# Patient Record
Sex: FEMALE | Race: WHITE | Employment: FULL TIME
[De-identification: names, ages, dates, MRNs, and addresses within clinical notes are randomized per-mention and may not be internally consistent; named-entity substitution may affect disease eponyms.]

---

## 2017-06-17 ENCOUNTER — HEALTH MAINTENANCE LETTER (OUTPATIENT)
Age: 42
End: 2017-06-17

## 2018-11-05 ENCOUNTER — OFFICE VISIT (OUTPATIENT)
Dept: BEHAVIORAL HEALTH | Facility: CLINIC | Age: 43
End: 2018-11-05
Payer: COMMERCIAL

## 2018-11-05 ENCOUNTER — OFFICE VISIT (OUTPATIENT)
Dept: FAMILY MEDICINE | Facility: CLINIC | Age: 43
End: 2018-11-05
Payer: COMMERCIAL

## 2018-11-05 VITALS
WEIGHT: 126 LBS | RESPIRATION RATE: 16 BRPM | HEART RATE: 83 BPM | SYSTOLIC BLOOD PRESSURE: 127 MMHG | DIASTOLIC BLOOD PRESSURE: 84 MMHG | TEMPERATURE: 98.3 F | OXYGEN SATURATION: 100 % | BODY MASS INDEX: 21.63 KG/M2

## 2018-11-05 DIAGNOSIS — Z28.21 INFLUENZA VACCINATION DECLINED: ICD-10-CM

## 2018-11-05 DIAGNOSIS — G89.29 CHRONIC LOW BACK PAIN, UNSPECIFIED BACK PAIN LATERALITY, WITH SCIATICA PRESENCE UNSPECIFIED: ICD-10-CM

## 2018-11-05 DIAGNOSIS — G89.4 CHRONIC PAIN ASSOCIATED WITH SIGNIFICANT PSYCHOSOCIAL DYSFUNCTION: ICD-10-CM

## 2018-11-05 DIAGNOSIS — M79.606 PAIN OF LOWER EXTREMITY, UNSPECIFIED LATERALITY: ICD-10-CM

## 2018-11-05 DIAGNOSIS — R52 GENERALIZED BODY ACHES: ICD-10-CM

## 2018-11-05 DIAGNOSIS — R53.82 CHRONIC FATIGUE: ICD-10-CM

## 2018-11-05 DIAGNOSIS — F41.1 GENERALIZED ANXIETY DISORDER: ICD-10-CM

## 2018-11-05 DIAGNOSIS — R41.840 DIFFICULTY CONCENTRATING: ICD-10-CM

## 2018-11-05 DIAGNOSIS — M25.649 STIFFNESS OF HAND JOINT, UNSPECIFIED LATERALITY: ICD-10-CM

## 2018-11-05 DIAGNOSIS — M54.2 CERVICALGIA: ICD-10-CM

## 2018-11-05 DIAGNOSIS — Z13.6 ENCOUNTER FOR SCREENING FOR CARDIOVASCULAR DISORDERS: ICD-10-CM

## 2018-11-05 DIAGNOSIS — F41.1 GENERALIZED ANXIETY DISORDER: Primary | ICD-10-CM

## 2018-11-05 DIAGNOSIS — M54.5 CHRONIC LOW BACK PAIN, UNSPECIFIED BACK PAIN LATERALITY, WITH SCIATICA PRESENCE UNSPECIFIED: ICD-10-CM

## 2018-11-05 DIAGNOSIS — F32.9 MAJOR DEPRESSIVE DISORDER, REMISSION STATUS UNSPECIFIED, UNSPECIFIED WHETHER RECURRENT: Primary | ICD-10-CM

## 2018-11-05 DIAGNOSIS — M65.30 TRIGGER FINGER, ACQUIRED: ICD-10-CM

## 2018-11-05 LAB
BASOPHILS # BLD AUTO: 0 10E9/L (ref 0–0.2)
BASOPHILS NFR BLD AUTO: 0.2 %
DIFFERENTIAL METHOD BLD: NORMAL
EOSINOPHIL # BLD AUTO: 0.1 10E9/L (ref 0–0.7)
EOSINOPHIL NFR BLD AUTO: 1.8 %
ERYTHROCYTE [DISTWIDTH] IN BLOOD BY AUTOMATED COUNT: 11.8 % (ref 10–15)
ERYTHROCYTE [SEDIMENTATION RATE] IN BLOOD BY WESTERGREN METHOD: 10 MM/H (ref 0–20)
HCT VFR BLD AUTO: 36 % (ref 35–47)
HGB BLD-MCNC: 12 G/DL (ref 11.7–15.7)
LYMPHOCYTES # BLD AUTO: 2.8 10E9/L (ref 0.8–5.3)
LYMPHOCYTES NFR BLD AUTO: 42 %
MCH RBC QN AUTO: 30.8 PG (ref 26.5–33)
MCHC RBC AUTO-ENTMCNC: 33.3 G/DL (ref 31.5–36.5)
MCV RBC AUTO: 93 FL (ref 78–100)
MONOCYTES # BLD AUTO: 0.6 10E9/L (ref 0–1.3)
MONOCYTES NFR BLD AUTO: 8.6 %
NEUTROPHILS # BLD AUTO: 3.1 10E9/L (ref 1.6–8.3)
NEUTROPHILS NFR BLD AUTO: 47.4 %
PLATELET # BLD AUTO: 192 10E9/L (ref 150–450)
RBC # BLD AUTO: 3.89 10E12/L (ref 3.8–5.2)
WBC # BLD AUTO: 6.6 10E9/L (ref 4–11)

## 2018-11-05 PROCEDURE — 82607 VITAMIN B-12: CPT | Performed by: FAMILY MEDICINE

## 2018-11-05 PROCEDURE — 86431 RHEUMATOID FACTOR QUANT: CPT | Performed by: FAMILY MEDICINE

## 2018-11-05 PROCEDURE — 80061 LIPID PANEL: CPT | Performed by: FAMILY MEDICINE

## 2018-11-05 PROCEDURE — 84439 ASSAY OF FREE THYROXINE: CPT | Performed by: FAMILY MEDICINE

## 2018-11-05 PROCEDURE — 99204 OFFICE O/P NEW MOD 45 MIN: CPT | Performed by: FAMILY MEDICINE

## 2018-11-05 PROCEDURE — 83540 ASSAY OF IRON: CPT | Performed by: FAMILY MEDICINE

## 2018-11-05 PROCEDURE — 86140 C-REACTIVE PROTEIN: CPT | Performed by: FAMILY MEDICINE

## 2018-11-05 PROCEDURE — 85652 RBC SED RATE AUTOMATED: CPT | Performed by: FAMILY MEDICINE

## 2018-11-05 PROCEDURE — 86038 ANTINUCLEAR ANTIBODIES: CPT | Performed by: FAMILY MEDICINE

## 2018-11-05 PROCEDURE — 86200 CCP ANTIBODY: CPT | Performed by: FAMILY MEDICINE

## 2018-11-05 PROCEDURE — 85025 COMPLETE CBC W/AUTO DIFF WBC: CPT | Performed by: FAMILY MEDICINE

## 2018-11-05 PROCEDURE — 82306 VITAMIN D 25 HYDROXY: CPT | Performed by: FAMILY MEDICINE

## 2018-11-05 PROCEDURE — 36415 COLL VENOUS BLD VENIPUNCTURE: CPT | Performed by: FAMILY MEDICINE

## 2018-11-05 PROCEDURE — 83550 IRON BINDING TEST: CPT | Performed by: FAMILY MEDICINE

## 2018-11-05 PROCEDURE — 80053 COMPREHEN METABOLIC PANEL: CPT | Performed by: FAMILY MEDICINE

## 2018-11-05 PROCEDURE — 84443 ASSAY THYROID STIM HORMONE: CPT | Performed by: FAMILY MEDICINE

## 2018-11-05 PROCEDURE — 99207 ZZC NO CHARGE BEHAVIORAL WARM HANDOFF: CPT | Performed by: SOCIAL WORKER

## 2018-11-05 PROCEDURE — 82728 ASSAY OF FERRITIN: CPT | Performed by: FAMILY MEDICINE

## 2018-11-05 ASSESSMENT — ANXIETY QUESTIONNAIRES
2. NOT BEING ABLE TO STOP OR CONTROL WORRYING: SEVERAL DAYS
3. WORRYING TOO MUCH ABOUT DIFFERENT THINGS: MORE THAN HALF THE DAYS
5. BEING SO RESTLESS THAT IT IS HARD TO SIT STILL: MORE THAN HALF THE DAYS
1. FEELING NERVOUS, ANXIOUS, OR ON EDGE: SEVERAL DAYS
6. BECOMING EASILY ANNOYED OR IRRITABLE: MORE THAN HALF THE DAYS
IF YOU CHECKED OFF ANY PROBLEMS ON THIS QUESTIONNAIRE, HOW DIFFICULT HAVE THESE PROBLEMS MADE IT FOR YOU TO DO YOUR WORK, TAKE CARE OF THINGS AT HOME, OR GET ALONG WITH OTHER PEOPLE: SOMEWHAT DIFFICULT
7. FEELING AFRAID AS IF SOMETHING AWFUL MIGHT HAPPEN: NOT AT ALL
GAD7 TOTAL SCORE: 11

## 2018-11-05 ASSESSMENT — PATIENT HEALTH QUESTIONNAIRE - PHQ9
5. POOR APPETITE OR OVEREATING: NEARLY EVERY DAY
SUM OF ALL RESPONSES TO PHQ QUESTIONS 1-9: 14

## 2018-11-05 NOTE — MR AVS SNAPSHOT
After Visit Summary   11/5/2018    Nasrin Willams    MRN: [id number]           Patient Information     Date Of Birth          1975        Visit Information        Provider Department      11/5/2018 8:04 AM Darren Kaur, [facility]        Today's Diagnoses     Generalized anxiety disorder    -  1       Follow-ups after your visit        Your next 10 appointments already scheduled     Nov 19, 2018  2:40 PM CST   PHYSICAL with Jazmine Mccarty MD   [facility]    [address] [place] [id number] [phone]              Who to contact     If you have questions or need follow up information about today's clinic visit or your schedule please contact [facility] directly at [phone].  Normal or non-critical lab and imaging results will be communicated to you by MyChart, letter or phone within 4 business days after the clinic has received the results. If you do not hear from us within 7 days, please contact the clinic through MyChart or phone. If you have a critical or abnormal lab result, we will notify you by phone as soon as possible.  Submit refill requests through [employer] or call your pharmacy and they will forward the refill request to us. Please allow 3 business days for your refill to be completed.          Additional Information About Your Visit        MyChart Information     [employer] gives you secure access to your electronic health record. If you see a primary care provider, you can also send messages to your care team and make appointments. If you have questions, please call your primary care clinic.  If you do not have a primary care provider, please call [phone] and they will assist you.        Care EveryWhere ID     This is your Care EveryWhere ID. This could be used by other organizations to access your [facility] medical records  [id number]         Blood Pressure from Last 3  Encounters:   11/05/18 127/84   10/16/15 122/81   05/12/15 120/78    Weight from Last 3 Encounters:   11/05/18 57.2 kg (126 lb)   10/16/15 60.1 kg (132 lb 8 oz)   05/12/15 63.3 kg (139 lb 8 oz)              Today, you had the following     No orders found for display         Today's Medication Changes          These changes are accurate as of 11/5/18 11:59 PM.  If you have any questions, ask your nurse or doctor.               Stop taking these medicines if you haven't already. Please contact your care team if you have questions.     HYDROcodone-acetaminophen 5-325 MG per tablet   Commonly known as:  NORCO   Stopped by:  Jazmine Mccarty MD                    Primary Care Provider Office Phone # Fax #    Zee Abebe [phone] [address]  [facility] [address]        Equal Access to Services     [facility]: Hadii brandon hidalgo hadasho Socris, waaxda luqadaha, qaybta kaalmada adepreetiyada, radha hammond . So [facility] [phone].    ATENCIÓN: Si habla español, tiene a hill disposición servicios gratuitos de asistencia lingüística. Deyanira al [phone].    We comply with applicable federal civil rights laws and [place] laws. We do not discriminate on the basis of race, color, national origin, age, disability, sex, sexual orientation, or gender identity.            Thank you!     Thank you for choosing [facility]  for your care. Our goal is always to provide you with excellent care. Hearing back from our patients is one way we can continue to improve our services. Please take a few minutes to complete the written survey that you may receive in the mail after your visit with us. Thank you!             Your Updated Medication List - Protect others around you: Learn how to safely use, store and throw away your medicines at www.disposemymeds.org.          This list is accurate as of 11/5/18 11:59 PM.  Always use your most recent med list.                    Brand Name Dispense Instructions for use Diagnosis    ibuprofen 200 MG tablet    ADVIL/MOTRIN     Take 200 mg by mouth every 4 hours as needed for mild pain

## 2018-11-05 NOTE — PROGRESS NOTES
SUBJECTIVE:   Nasrin Willams is a 43 year old female who presents to clinic today for the following health issues:    Body aches, hand swelling and stiffness, swelling of hands, trigger finger on my right ring finger. pain in my legs. 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 Pt notes that she also has symptoms throughout entire body    New to me, numerous symptoms, last seen in . Reports not doctored else where. Hx of smoking, chronic pain, cervicalgia, lumbago, myofacial pain, scapula dyskinesia in the past, hx of VICKY, MDD, prior  x3, EAB, cervical biopsy, noted hx of vertebral artery dissection in  on MRI, hospitalized, managed conservatively , ct angio in  was negative for any dissection or lesion. Notes hx of seizures with tramadol in the past, last seen by Daniele for an ingrown toenail in  & referred to podiatry but never went. PCP marked as Dr Abebe but not seen her in a while either.     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 Hands are becoming stiff and swollen and right 4th finger thinks has a trigger finger as it pops and snaps after able to bend it. Right elbow bothering her 5 months now that comes and goes, has chronic upper, mid  and lower back and a kink in neck, it feels a bit stiff. Notes has not doctored in a while. Has worn tennis elbow,  braces, taken tylenol or ibuprofen, done ice and heat & they've not helped. Tries to stretch in the morning. In the morning and night is when her hands hurt the most. Mostly her right hand. Is right handed. Can hardly bend fingers at times. Usually when gets home from work day within one hour every thing starts swelling. Also notes past couple days her legs & back of thighs ache & and legs are stiff and sore. When waking up in am feels like an 80 yr old getting out of bed. Looked up online and her symptoms match fibromyalgia.    No fever or chills, no headache or dizziness, no double vision, mild blurry vision,  Worse at night , no facial pain, earache, sore throat, runny nose, post nasal drip, no trouble hearing, smelling, tasting or swallowing, no cough , no chest pain, trouble breathing, occasional  Palpitations and has to take a breath at times, No abdominal pain, heart burn, reflux, nausea or vomiting or diarrhea, always constipated, goes couple weeks with no bowel movement, told by previous doctors ( ? GI)  that's how her body works, told to eat more fiber, has tried metamucil and nothing works so when gets to two week kevin takes a laxative. Dulcolax OTC.  No blood in stools or black stools, no weight loss, some night sweats. Feels itchy upper abdomen, No dysuria, hematuria, frequency, urgency, hesitancy, incontinence, No pelvic complaints.No rash. No breast issues    Past few months periods have gotten extremely heavy and more painful than they used to be. Periods are regular every 28 to 3 days, lasts 4 to 8 days. Not heavy first day then heavy on day 2, 3 & 4, when has to change pads or tampon every couple hrs lashell the last time she had her period. LMP 10/9/18. De any day. Denies being pregnant.     Is a Single mom of three kids ages 18 to 11, working hard with not much help from their father, Feels all over the place not focussed, notes had ADHD as a child. More irritable and has less patience, would like  evaluation for ADHD.    Hx of vertebral artery dissection. notes in 2006 right after her wedding she felt like a pinched nerve while working after her honeymoon, & then her head all of a sudden felt it was going to explode, next thing she knew she was in an ambulance, dx with vertebral artery dissection & was in the  hospital 9 days, on coumadin and Vicodin at that time. Was on coumadin total 4 months ad recheck since then last ct angio in 2013 have shown no issues. ( presented with a two-week history of some stiffness in her neck and some intermittent discomfort and numbness in the right arm and hand.  While driving on the day of admission she felt woozy and had severe headache that became generalized.  She was seen in the Emergency Room with an unremarkable CT of the head and spinal tap.  An MRI of the brain looked normal, but an MR angiogram revealed a right vertebral dissection covering 5-6 cm over the mid portion of the right vertebral artery with flow above the dissection. She had unremarkable laboratory studies, including a comprehensive metabolic panel, spinal fluid studies and culture, lipid panel, sedimentation rate, homocystine level, urinalysis, and urine culture.  She was managed with heparin and started on Coumadin therapy.  She had persistent headache and nausea, but remained neurologically intact.  She was deemed well enough to return home on Monday, 02/13/2006 on Coumadin therapy.  Her INR at the time of discharge was 2.1 and her heparin was discontinued prior to discharge.  She was advised to avoid work for about a week or ten days, and then resume lighter duties until followed up with Dr. Rust on 03/06.  She was given education about Coumadin therapy, the importance of INR follow-ups and medication adjustments to maintain her anticoagulated state in the appropriate range.)     Seen 10/2009 for back strain treated with flexeril, ibuprofen & Vicodin.   12/2009 in er for abdominal pain, possible  kidney stone treated with Vicodin  3/2010 in er for pyelonephritis & dehydration  2010 back pain while pregnant, ? UTI Rx with Vicodin, Macrobid & Zofran.  2010 seen for back train advised exercises& given Vicodin  11 seen for elbow pain, xray neg  2011 UTI given Cipro  11 seen for std testing  11 uc for dysuria & referred to urology  11 vaginal bleeding/ missed spontaneous , given norco  11 sore throat & dysuria  11/15/11 UC for contusion of 1st & 5 th toe, symptomatic measures  11 ER for acute right elbow injury, & myofascial low back pain.   2012 physical & given Macrobid for dysuria  12 seen for migraine, constipation & urinary frequency, given Imitrex & Vicodin.  12 in er for abdominal pain secondary to constipation. Advised metamucil etc.  10/2/2012 seen for tension headaches & back pain & given flexeril & Vicodin & naproxen  10/8/18 seen for mechanical low back pain & referred to PT but also given more flexeril &  Vicodin & referred to neurology  12 seen for skin tags & a mole  10/22/12 no showed then asking refills on narcotics.  12 seen for anxiety , depression and back pain and given lexapro and more Vicodin. Lexapro switched to Zoloft due to formulary changes.  Noted taking more than recommended Vicodin and norflex and running out early  12 seen by sports med,MRI back normal  12/3/12 given more norco  13 treated for influenza, depression and given more Vicodin and referred to pain med  13 seen in ER for headache & right sided facial weakness,  and underwent an MYÁ of the brain, head and neck without any acute findings. She also had a CTA of the carotid vessels. At that time she had some  chronic pain on the left, dx with bells palsy , shoulder pain, and left arm paresthesia, discharged on percoset, prednisone, and valcyclovir.   2013 seen in UC and given flexeril and norco  13 seen in uc for neck spasms given  "valium & referred to ortho.   9/16/18 telephone encounter & got more Vicodin  In er 9/19/13 for persistent right shoulder pain, dx with right cervical radiculopathy & given more Vicodin  9/23 seen in clinic and continued on medrol, percoset & ibuprofen.  Ct neck unremarkable.  9/30 called & got 20 more percoset  10/7/2013 seen by [facility]. \" seems to focus on her symptoms and has somatization tendencies with past history of low back pain and headaches.  Her current complaint is mainly on the right shoulder girdle, primarily myofascial with equivocal neuropathic pain along the right upper extremity (her dominant extremity).  Her chronic pain syndrome is compounded by multiple psychosocial stressors. Advised Lidoderm 5% apply 1 patch to the affected area every day for 12 hrs (12 hrs on and 12 hrs off). Continue ibuprofen 600 mg Po q 8 hrs as needed, for 1 week.  Drink plenty of fluids.given  Methocarbamol 750 mg PO q 8 hrs as needed.  Advised to discuss with Dr. Ely PCP about the potential addition of an anxiolytic/antidepressant. Would not recommend chronic opioid medications.  Would discontinue them after done with the current supply\"  10/9/13 seen by pain psychologist  10/9/13 given tizanidine & tramadol  10/13/13 seen in er for seizures, \" she was drinking last night and felt hung over for most of the day today.  She says she relaxed most of the day and was eating and drinking normally.  The patient reports she had a headache for most of the day today.  She indicates tonight she was sitting on the couch with her boyfriend when she lost consciousness.  The patient s boyfriend reports the patient yelled out, her face went blank, she tensed up, and she began shaking.  He states the patient was shaking for about a minute, and it took her another minute or two to wake up.  The patient's boyfriend reports it took her another ten minutes after waking up to return to normal.  He notes the patient lost " "control of her bladder during the episode.  The patient states she has never had an episode like this before, and she currently complains of headache and new neck pain.  She does not think she had neck pain prior to this episode.  The patient notes she started taking Methocarbamol and Tramadol this week for chronic back and shoulder pain.  The patient denies vomiting, rhinorrhea, sore throat, or any other complaints. CT of the head is negative.  She just had MRI/MYÁ a month and a half ago, and this was unremarkable, making aneurysm, bleed, or tumor highly unlikely.  She does have some problems with chronic pain in her shoulder, and they have been changing some of her medications, including muscle relaxants and pain medications, and certainly this could have contributed. Recommended rest, no driving, follow up with PMD, and follow up with [facility]. \"  10/14/13 seen post hospital follow & advised not to drink.  11/5/13 seen for UTI symptoms , stye & backpain & given Cipro & eye ointment.  12/3/13 seen by Daniele for shoulder pain & given Tdap, flexeril & referred to PT.   12/4/13 given more Vicodin by PCP Dr Abebe & Voltaren gel  12/19/13 more norco sent in on telephone call for more.  9/24/14 seen for strep throat & given amoxil & more norco  12/1/14 seen for abdominal pain.  12/31/15 seen for headache called a migraine & nasal congestion & given Toradol & Flonase.  1/26/15 seen for finger infection& headache & given keflex & Toradol.  5/12/15 seen for bv  1016/15 see for ingrown nail referred to podiatry & given Nizoral for tinea versicolor  10/2015 one hr late for apt & rescheduled  11/16/15 no showed     Pain med hx in epic  10/29/18vicodin 5/500 mg # 20  11/7/10 Vicodin #5  1/14/11 Vicodin norco # 15  4/9/11 Vicodin norco #30  6/8/11 Vicodin norco #10  12/22/11 Vicodin norco #20  1/13/12 tylenol3 # 12  7/18/12 Imitrex 50mg # 6 1 refill  7/18/12 Vicodin norco #20  10/2/12 Vicodin norco " #15  10/8/12 Vicodin norco #15  10/12/12 Vicodin norco#15  10/24/12  Vicodin norco #15  11/5/12 Vicodin norco #30  11/19/12 Vicodin norco #30  12/3/12 Vicodin #30  12/17/12 Vicodin#30  1/2/13 Vicodin #60 w 2 refills  8/19/13 percoset #20  9/11/13 hydrocodone-acet #15  9/16/13 hydrocodone-acet #20  9/19/13 hydrocodone-acet #15  9/23/13 percoset #20  9/30/13 percoset #20  10/14/13 tramadol #180  12/5/12 norco #20  12/17/13 norco #20  9/24/14 norco # 8  1/3/15 norco # 12    Mn  negative    PHQ-9 ([employer]) 11/5/2018   No Interest In Doing Things    Feeling Depressed    Trouble Sleeping    Tired / No Energy    No appetite or Over-Eating    Feeling Bad about Self    Trouble Concentrating    Moving Slow or Restless    Suicidal Thoughts    Total Score    1.  Little interest or pleasure in doing things 2   2.  Feeling down, depressed, or hopeless 1   3.  Trouble falling or staying asleep, or sleeping too much 1   4.  Feeling tired or having little energy 3   5.  Poor appetite or overeating 1   6.  Feeling bad about yourself 1   7.  Trouble concentrating 2   8.  Moving slowly or restless 3   9.  Suicidal or self-harm thoughts 0   PHQ-9 Total Score 14   Difficulty at work, home, or with people Somewhat difficult   VICKY-7   [employer], 2002; Used with Permission) 11/5/2018   Over the last 2 weeks, how often have you been bothered by feeling nervous, anxious or on edge?    Over the last 2 weeks, how often have you been bothered by not being able to stop or control worrying?    Over the last 2 weeks, how often have you been bothered by worrying too much about different things?    Over the last 2 weeks, how often have you been bothered by trouble relaxing?    Over the last 2 weeks, how often have you been bothered by being so restless that it is hard to sit still?    Over the last 2 weeks, how often have you been bothered by becoming easily annoyed or irritable?    Over the last 2 weeks, how often have you been bothered by  feeling afraid as if something awful might happen?    VICKY-7 Total Score =     1. Feeling nervous, anxious, or on edge 1   2. Not being able to stop or control worrying 1   3. Worrying too much about different things 2   4. Trouble relaxing 3   5. Being so restless that it is hard to sit still 2   6. Becoming easily annoyed or irritable 2   7. Feeling afraid, as if something awful might happen 0   VICKY-7 Total Score 11   If you checked any problems, how difficult have they made it for you to do your work, take care of things at home, or get along with other people? Somewhat difficult     Problem list and histories reviewed & adjusted, as indicated.  Additional history: as documented    Patient Active Problem List   Diagnosis     Lumbago     Major depression     Generalized anxiety disorder     Chronic pain associated with significant psychosocial dysfunction     Scapular dysfunction     Cervicalgia     Past Surgical History:   Procedure Laterality Date     C NONSPECIFIC PROCEDURE  11/29/99    vaginal delivery x 1     SURGICAL HISTORY OF -   2001    EAB     SURGICAL HISTORY OF -   4/21/97    cervical biopsy       Social History   Substance Use Topics     Smoking status: Current Some Day Smoker     Types: Other     Smokeless tobacco: Never Used      Comment: vape     Alcohol use Yes      Comment: rare     Family History   Problem Relation Age of Onset     Hypertension Mother      Thyroid Disease Mother      HEART DISEASE Brother      MI         Current Outpatient Prescriptions   Medication Sig Dispense Refill     ibuprofen (ADVIL,MOTRIN) 200 MG tablet Take 200 mg by mouth every 4 hours as needed for mild pain       Allergies   Allergen Reactions     Tramadol      seizure     Recent Labs   Lab Test  11/05/18   1900  12/01/14   2018  10/13/13   0108   LDL  54   --    --    HDL  49*   --    --    TRIG  198*   --    --    ALT  24  30  51*   CR  0.57  0.61  0.81   GFRESTIMATED  >90  >90  Non African American GFR Calc    80    GFRESTBLACK  >90  >90  African American GFR Calc    >90   POTASSIUM  4.4  3.9  3.7   TSH  7.08*   --    --       BP Readings from Last 3 Encounters:   11/05/18 127/84   10/16/15 122/81   05/12/15 120/78    Wt Readings from Last 3 Encounters:   11/05/18 126 lb (57.2 kg)   10/16/15 132 lb 8 oz (60.1 kg)   05/12/15 139 lb 8 oz (63.3 kg)                  Labs reviewed in EPIC    Reviewed and updated as needed this visit by clinical staff       Reviewed and updated as needed this visit by Provider         ROS:  Constitutional, HEENT, cardiovascular, pulmonary, GI, , musculoskeletal, neuro, skin, endocrine and psych systems are negative, except as otherwise noted.    OBJECTIVE:     /84 (BP Location: Left arm, Patient Position: Chair, Cuff Size: Adult Regular)  Pulse 83  Temp 98.3  F (36.8  C) (Oral)  Resp 16  Wt 126 lb (57.2 kg)  SpO2 100%  BMI 21.63 kg/m2  Body mass index is 21.63 kg/(m^2).  GENERAL: healthy, alert and no distress  EYES: Eyes grossly normal to inspection, PERRL and conjunctivae and sclerae normal  HENT: ear canals and TM's normal, nose and mouth without ulcers or lesions  NECK: no adenopathy, no asymmetry, masses, or scars and thyroid normal to palpation  RESP: lungs clear to auscultation - no rales, rhonchi or wheezes  CV: regular rate and rhythm, normal S1 S2, no S3 or S4, no murmur, click or rub, no peripheral edema and peripheral pulses strong  ABDOMEN: soft, non tender, no hepatosplenomegaly, no masses and bowel sounds normal  MS: no gross musculoskeletal defects noted, no edema,right 4th finger PIP trigger joint  SKIN: no suspicious lesions or rashes  NEURO: Normal strength and tone, mentation intact and speech normal  PSYCH: mentation appears normal, affect normal/bright, anxious, fatigued, judgement and insight intact and appearance well groomed    Diagnostic Test Results:  No results found for this or any previous visit (from the past 24 hour(s)).    ASSESSMENT/PLAN:     1. Major  depressive disorder, remission status unspecified, unspecified whether recurrent  Safe, not suicidal. Pain and hard work affecting mood. Declines meds. Labs today to evaluate symptoms. Would avoid narcotics. Cymbalta may be a choice to consider. Meet with Darren. Consider counseling.Referral for diagnostic assessment, therapy and psychiatrist made .Referral to PT and ortho for some of your symptoms and trigger finger. They will be calling her. Will hold of on a pain consult yet. referral placed but no apt made yet.suspect myofascial and fibromyalgia. Narcotics indicated in neither case. Follow up in 2 weeks for a physical, pap and follow up from today. Consider the flu vaccine.reports does vaping, cessation may help long term. Depression action plan given   - CBC with platelets differential  - Comprehensive metabolic panel  - TSH with free T4 reflex  - MENTAL HEALTH REFERRAL  - Adult; Outpatient Treatment, Psychiatry and Medication Management; Individual/Couples/Family/Group Therapy/Health Psychology; FM: [facility] [phone]; We will contact you to schedule the appointment...  - DEPRESSION ACTION PLAN (DAP)  - MENTAL HEALTH REFERRAL  - Adult; Assessments and Testing, Outpatient Treatment; ADHD; FMG: [facility] [phone]; We will contact you to schedule the appointment or please call with any questions; Individual/Couples/Family/Soila...  - T4 free  - T4 free    2. Chronic fatigue  Unclear etiology/diagnosis with uncertain prognosis requiring further workup below. Suspect multifactorial  - CBC with platelets differential  - Comprehensive metabolic panel  - TSH with free T4 reflex  - ESR: Erythrocyte sedimentation rate  - CRP, inflammation  - Vitamin B12  - Vitamin D Deficiency  - Ferritin  - Iron and iron binding capacity  - Anti Nuclear Tri IgG by IFA with Reflex  - [facility] PT, HAND, AND CHIROPRACTIC REFERRAL; Future  - ORTHO  REFERRAL  - PAIN MANAGEMENT REFERRAL  -  MENTAL HEALTH REFERRAL  - Adult; Outpatient Treatment, Psychiatry and Medication Management; Individual/Couples/Family/Group Therapy/Health Psychology; [facility] [phone]; We will contact you to schedule the appointment...  - Rheumatoid factor  - Cyclic Citrullinated Peptide Antibody IgG    3. Chronic pain associated with significant psychosocial dysfunction  Avoid narcotics. May beneft from pain for other solutions like accupuncture massage therapy for fibromyalgia.  - CBC with platelets differential  - Comprehensive metabolic panel  - TSH with free T4 reflex  - ESR: Erythrocyte sedimentation rate  - CRP, inflammation  - Vitamin B12  - Vitamin D Deficiency  - Ferritin  - Iron and iron binding capacity  - Anti Nuclear Tri IgG by IFA with Reflex  - [facility], AND CHIROPRACTIC REFERRAL; Future  - ORTHO  REFERRAL  - PAIN MANAGEMENT REFERRAL  - MENTAL HEALTH REFERRAL  - Adult; Outpatient Treatment, Psychiatry and Medication Management; Individual/Couples/Family/Group Therapy/Health Psychology; [facility] [phone]; We will contact you to schedule the appointment...  - Rheumatoid factor  - Cyclic Citrullinated Peptide Antibody IgG    4. Generalized anxiety disorder  - CBC with platelets differential  - Comprehensive metabolic panel  - TSH with free T4 reflex  - MENTAL HEALTH REFERRAL  - Adult; Outpatient Treatment, Psychiatry and Medication Management; Individual/Couples/Family/Group Therapy/Health Psychology; [facility] [place] [phone]; We will contact you to schedule the appointment...    5. Chronic low back pain, unspecified back pain laterality, with sciatica presence unspecified  - CBC with platelets differential  - Comprehensive metabolic panel  - TSH with free T4 reflex  - ESR: Erythrocyte sedimentation rate  - CRP, inflammation  - Anti Nuclear Tri IgG by IFA with Reflex  - [facility], [facility] CHIROPRACTIC REFERRAL; Future  -  ORTHO  REFERRAL  - PAIN MANAGEMENT REFERRAL  - Rheumatoid factor  - Cyclic Citrullinated Peptide Antibody IgG    6. Cervicalgia  - CBC with platelets differential  - Comprehensive metabolic panel  - ESR: Erythrocyte sedimentation rate  - CRP, inflammation  - Anti Nuclear Tri IgG by IFA with Reflex  - [facility], [facility] CHIROPRACTIC REFERRAL; Future  - ORTHO  REFERRAL  - PAIN MANAGEMENT REFERRAL  - Rheumatoid factor  - Cyclic Citrullinated Peptide Antibody IgG    7. Generalized body aches  - CBC with platelets differential  - Comprehensive metabolic panel  - TSH with free T4 reflex  - ESR: Erythrocyte sedimentation rate  - CRP, inflammation  - Ferritin    8. Stiffness of hand joint, unspecified laterality  - CBC with platelets differential  - Comprehensive metabolic panel  - ESR: Erythrocyte sedimentation rate  - CRP, inflammation  - Anti Nuclear Tri IgG by IFA with Reflex  - [facility], [facility] CHIROPRACTIC REFERRAL; Future  - ORTHO  REFERRAL  - PAIN MANAGEMENT REFERRAL  - Rheumatoid factor  - Cyclic Citrullinated Peptide Antibody IgG    9. Trigger finger, acquired right 4th PIP   See ortho  - CBC with platelets differential  - Comprehensive metabolic panel  - ESR: Erythrocyte sedimentation rate  - CRP, inflammation  - Anti Nuclear Tri IgG by IFA with Reflex  - [facility] HAND, AND CHIROPRACTIC REFERRAL; Future  - ORTHO  REFERRAL  - PAIN MANAGEMENT REFERRAL  - Rheumatoid factor  - Cyclic Citrullinated Peptide Antibody IgG    10. Pain of lower extremity, unspecified laterality  - CBC with platelets differential  - Comprehensive metabolic panel  - Ferritin  - Iron and iron binding capacity  - Anti Nuclear Tri IgG by IFA with Reflex  - [facility], AND CHIROPRACTIC REFERRAL; Future  - ORTHO  REFERRAL  - PAIN MANAGEMENT REFERRAL    11. Difficulty concentrating  Reports ADHD as a child. Will need reevaluating as an adult. And see psyche after that for meds if indicated.   - MENTAL  HEALTH REFERRAL  - Adult; Assessments and Testing; ADHD; FMG: [facility] [phone]; We will contact you to schedule the appointment or please call with any questions    12. Encounter for screening for cardiovascular disorders  - Lipid panel reflex to direct LDL Non-fasting    13. Influenza vaccination declined    See Patient Instructions    Jazmine Mccarty MD  [facility]

## 2018-11-05 NOTE — LETTER
My Depression Action Plan  Name: Nasrin Willams   Date of Birth 1975  Date: 11/5/2018    My doctor: Zee Abebe   My clinic: [address] [address] [phone]          GREEN    ZONE   Good Control    What it looks like:     Things are going generally well. You have normal up s and down s. You may even feel depressed from time to time, but bad moods usually last less than a day.   What you need to do:  1. Continue to care for yourself (see self care plan)  2. Check your depression survival kit and update it as needed  3. Follow your physician s recommendations including any medication.  4. Do not stop taking medication unless you consult with your physician first.           YELLOW         ZONE Getting Worse    What it looks like:     Depression is starting to interfere with your life.     It may be hard to get out of bed; you may be starting to isolate yourself from others.    Symptoms of depression are starting to last most all day and this has happened for several days.     You may have suicidal thoughts but they are not constant.   What you need to do:     1. Call your care team, your response to treatment will improve if you keep your care team informed of your progress. Yellow periods are signs an adjustment may need to be made.     2. Continue your self-care, even if you have to fake it!    3. Talk to someone in your support network    4. Open up your depression survival kit           RED    ZONE Medical Alert - Get Help    What it looks like:     Depression is seriously interfering with your life.     You may experience these or other symptoms: You can t get out of bed most days, can t work or engage in other necessary activities, you have trouble taking care of basic hygiene, or basic responsibilities, thoughts of suicide or death that will not go away, self-injurious behavior.     What you need to do:  1. Call your care  team and request a same-day appointment. If they are not available (weekends or after hours) call your local crisis line, emergency room or 911.            Depression Self Care Plan / Survival Kit    Self-Care for Depression  Here s the deal. Your body and mind are really not as separate as most people think.  What you do and think affects how you feel and how you feel influences what you do and think. This means if you do things that people who feel good do, it will help you feel better.  Sometimes this is all it takes.  There is also a place for medication and therapy depending on how severe your depression is, so be sure to consult with your medical provider and/ or Behavioral Health Consultant if your symptoms are worsening or not improving.     In order to better manage my stress, I will:    Exercise  Get some form of exercise, every day. This will help reduce pain and release endorphins, the  feel good  chemicals in your brain. This is almost as good as taking antidepressants!  This is not the same as joining a gym and then never going! (they count on that by the way ) It can be as simple as just going for a walk or doing some gardening, anything that will get you moving.      Hygiene   Maintain good hygiene (Get out of bed in the morning, Make your bed, Brush your teeth, Take a shower, and Get dressed like you were going to work, even if you are unemployed).  If your clothes don't fit try to get ones that do.    Diet  I will strive to eat foods that are good for me, drink plenty of water, and avoid excessive sugar, caffeine, alcohol, and other mood-altering substances.  Some foods that are helpful in depression are: complex carbohydrates, B vitamins, flaxseed, fish or fish oil, fresh fruits and vegetables.    Psychotherapy  I agree to participate in Individual Therapy (if recommended).    Medication  If prescribed medications, I agree to take them.  Missing doses can result in serious side effects.  I  understand that drinking alcohol, or other illicit drug use, may cause potential side effects.  I will not stop my medication abruptly without first discussing it with my provider.    Staying Connected With Others  I will stay in touch with my friends, family members, and my primary care provider/team.    Use your imagination  Be creative.  We all have a creative side; it doesn t matter if it s oil painting, sand castles, or mud pies! This will also kick up the endorphins.    Witness Beauty  (AKA stop and smell the roses) Take a look outside, even in mid-winter. Notice colors, textures. Watch the squirrels and birds.     Service to others  Be of service to others.  There is always someone else in need.  By helping others we can  get out of ourselves  and remember the really important things.  This also provides opportunities for practicing all the other parts of the program.    Humor  Laugh and be silly!  Adjust your TV habits for less news and crime-drama and more comedy.    Control your stress  Try breathing deep, massage therapy, biofeedback, and meditation. Find time to relax each day.     My support system    Clinic Contact:  Phone number:    Contact 1:  Phone number:    Contact 2:  Phone number:    Taoist/:  Phone number:    Therapist:  Phone number:    Local crisis center:    Phone number:    Other community support:  Phone number:

## 2018-11-06 ENCOUNTER — TELEPHONE (OUTPATIENT)
Dept: PALLIATIVE MEDICINE | Facility: CLINIC | Age: 43
End: 2018-11-06
Payer: COMMERCIAL

## 2018-11-06 LAB
ALBUMIN SERPL-MCNC: 4 G/DL (ref 3.4–5)
ALP SERPL-CCNC: 33 U/L (ref 40–150)
ALT SERPL W P-5'-P-CCNC: 24 U/L (ref 0–50)
ANION GAP SERPL CALCULATED.3IONS-SCNC: 10 MMOL/L (ref 3–14)
AST SERPL W P-5'-P-CCNC: 24 U/L (ref 0–45)
BILIRUB SERPL-MCNC: 0.3 MG/DL (ref 0.2–1.3)
BUN SERPL-MCNC: 10 MG/DL (ref 7–30)
CALCIUM SERPL-MCNC: 8.9 MG/DL (ref 8.5–10.1)
CHLORIDE SERPL-SCNC: 104 MMOL/L (ref 94–109)
CHOLEST SERPL-MCNC: 143 MG/DL
CO2 SERPL-SCNC: 27 MMOL/L (ref 20–32)
CREAT SERPL-MCNC: 0.57 MG/DL (ref 0.52–1.04)
CRP SERPL-MCNC: <2.9 MG/L (ref 0–8)
FERRITIN SERPL-MCNC: 30 NG/ML (ref 12–150)
GFR SERPL CREATININE-BSD FRML MDRD: >90 ML/MIN/1.7M2
GLUCOSE SERPL-MCNC: 78 MG/DL (ref 70–99)
HDLC SERPL-MCNC: 49 MG/DL
IRON SATN MFR SERPL: 23 % (ref 15–46)
IRON SERPL-MCNC: 72 UG/DL (ref 35–180)
LDLC SERPL CALC-MCNC: 54 MG/DL
NONHDLC SERPL-MCNC: 94 MG/DL
POTASSIUM SERPL-SCNC: 4.4 MMOL/L (ref 3.4–5.3)
PROT SERPL-MCNC: 7.1 G/DL (ref 6.8–8.8)
SODIUM SERPL-SCNC: 141 MMOL/L (ref 133–144)
T4 FREE SERPL-MCNC: 0.82 NG/DL (ref 0.76–1.46)
TIBC SERPL-MCNC: 309 UG/DL (ref 240–430)
TRIGL SERPL-MCNC: 198 MG/DL
TSH SERPL DL<=0.005 MIU/L-ACNC: 7.08 MU/L (ref 0.4–4)
VIT B12 SERPL-MCNC: 387 PG/ML (ref 193–986)

## 2018-11-06 ASSESSMENT — ANXIETY QUESTIONNAIRES: GAD7 TOTAL SCORE: 11

## 2018-11-06 NOTE — PROGRESS NOTES
Estrellita Ms. Job Willams,  Your results came back and are within acceptable limits.   -Liver and gallbladder tests are normal (ALT,AST, Alk phos, bilirubin), kidney function is normal (Cr, GFR), sodium is normal, potassium is normal, calcium is normal, glucose is normal.  -LDL(bad) cholesterol level is normal.  -HDL(good) cholesterol level is low and your triglycerides are elevated which can increase your heart disease risk.  A diet high in fat and simple carbohydrates, genetics and being overweight can contribute to this.   ADVISE: a regular exercise program with at least 30 minutes of aerobic exercise 3-4 days/week ( 45 minutes 4-6 days/week if weight loss needed), and omega-3 fatty acids (fish oil) 0156-5452 mg daily are helpful to improve this.  Rechecking your cholesterol in 12 months is recommended (LIPID w/ LDL reflex, DX: low HDL).  -TSH (thyroid stimulating hormone) is mildly abnormal . Will discuss at follow up apt. If you have any further concerns please do not hesitate to contact us by message, phone or making an appointment.  Have a good day   Dr Lul LIU

## 2018-11-06 NOTE — PROGRESS NOTES
Estrellita Levin Job Shieldslyly,  Some of your results came back and are within acceptable limits. -Normal red blood cell (hgb) levels, normal white blood cell count and normal platelet levels.. If you have any further concerns please do not hesitate to contact us by message, phone or making an appointment.  Have a good day   Dr Lul LIU

## 2018-11-06 NOTE — PROGRESS NOTES
Estrellita Willams,  Some of your results came back and are within acceptable limits. . If you have any further concerns please do not hesitate to contact us by message, phone or making an appointment.  Have a good day   Dr Lul LIU

## 2018-11-06 NOTE — PROGRESS NOTES
Estrellita Levin Job Willams,  Your results came back with a normal inflammation marker. If you have any further concerns please do not hesitate to contact us by message, phone or making an appointment.  Have a good day   Dr Lul LIU

## 2018-11-06 NOTE — PATIENT INSTRUCTIONS
Labs today to evaluate symptoms  meet with Darren  Consider counseling   Referral for diagnostic assessment, therapy and psychiatrist made   Referral to PT and ortho for some of your symptoms and trigger finger   They will be calling you  Will hold of on a pain consult yet. referral placed but no apt made yet  Follow up win 2 weeks for a physical, pap and follow up from today  consider the flu vaccine  vaping cessation may help long term  Depression action plan given

## 2018-11-06 NOTE — TELEPHONE ENCOUNTER
Referral received from Dr. Mccarty for new patient evaluation with [facility] is incomplete. The referral is missing the Opioid Agreement. Please place new referral (Order #[id number]) and complete all questions and do not delete any information. We will contact patient once correct referral is placed. Thank you.        Pati Donovan    [facility]

## 2018-11-06 NOTE — PROGRESS NOTES
Estrellita Levin Job Imer,  Your results came back with a normal vitamin b 12. If you have any further concerns please do not hesitate to contact us by message, phone or making an appointment.  Have a good day   Dr Lul LIU

## 2018-11-06 NOTE — LETTER
November 13, 2018    [facility]asrin Willams  [address] [facility] [place] [address]    Dear Nasrin,                                                                   You have been referred to [facility]. We have been unable to contact you by telephone to schedule your appointment. Please call our clinic at [phone] to schedule this appointment.       Sincerely,        [facility]

## 2018-11-07 ENCOUNTER — DOCUMENTATION ONLY (OUTPATIENT)
Dept: BEHAVIORAL HEALTH | Facility: CLINIC | Age: 43
End: 2018-11-07

## 2018-11-07 PROBLEM — R53.82 CHRONIC FATIGUE: Status: ACTIVE | Noted: 2018-11-07

## 2018-11-07 PROBLEM — R41.840 DIFFICULTY CONCENTRATING: Status: ACTIVE | Noted: 2018-11-07

## 2018-11-07 LAB
ANA SER QL IF: NEGATIVE
CCP AB SER IA-ACNC: 1 U/ML
DEPRECATED CALCIDIOL+CALCIFEROL SERPL-MC: 48 UG/L (ref 20–75)
RHEUMATOID FACT SER NEPH-ACNC: <20 IU/ML (ref 0–20)

## 2018-11-07 NOTE — PROGRESS NOTES
Estrellita Willams,  Your results came back negative for autoimmune testing. If you have any further concerns please do not hesitate to contact us by message, phone or making an appointment.  Have a good day   Dr Lul LIU

## 2018-11-07 NOTE — PROGRESS NOTES
Estrellita Willams,  Your results came back negative for rheumatoid arthritis. If you have any further concerns please do not hesitate to contact us by message, phone or making an appointment.  Have a good day   Dr Lul LIU

## 2018-11-07 NOTE — PROGRESS NOTES
Patient had appointment with his/her primary care physician. [facility] services were requested. Explained the role of the [facility] and provided informational handout and contact information for the [facility].   Patient presents with significant chronic medical concerns and history of depression.  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  Patient states the combination of medical problems that limit her mobility but also difficulty focusing.  Patient states she has 3 children and is a single parent which adds to the stress.  Patient is feeling overwhelmed at the present time.    Patient questions if she has ADD D.  Patient recalled as a child she met with the pediatrician who diagnosed her with ADHD and gave her a trial of Ritalin.  Patient states her parents discontinued after couple trials as did not support the diagnosis.  Patient states she is always thinking and trying to organize her day.  Patient reports she struggles with focusing and completing tasks.  Patient states she will often start task but does not complete them.    Discussed behavioral health home services but patient states due to her income, she is now applied for [place] sure which would not make her eligible for behavioral health home.    Plan    Patient was referred to a therapist and to an ADHD evaluation.

## 2018-11-07 NOTE — PROGRESS NOTES
Behavioral Health Home Services  No Data Recorded      Social Work Care Navigator Note      Patient: Nasrin Willams  Date: November 7, 2018  Preferred Name: Nasrin    Previous PHQ-9:   PHQ-9 SCORE 10/7/2013 5/12/2015 11/5/2018   Total Score 11 11 -   Total Score - - 14     Previous VICKY-7:   VICKY-7 SCORE 12/3/2012 1/2/2013 11/5/2018   Total Score 8 5 -   Total Score - - 11     NILDA LEVEL:  No flowsheet data found.    Preferred Contact:  No Data Recorded      Data: (subjective / Objective):    [facility] met face to face with pt on 11/5/18. She is no longer eligible for [facility] due to her insurance will be changing to [place] [employer] due to increase in income.    Filomena CULLEN  [facility] Care Coordinator-  [facility]  Tele: [phone]          Next 5 appointments (look out 90 days)     Nov 19, 2018  2:40 PM CST   PHYSICAL with Jazmine Mccarty MD   [facility] ([facility]    [address] [place] [id number] [phone]

## 2018-11-07 NOTE — PROGRESS NOTES
Estrellita Willams,  Your results came back negative for rheumatoid arthritis If you have any further concerns please do not hesitate to contact us by message, phone or making an appointment.  Have a good day   Dr Lul LIU

## 2018-11-07 NOTE — PROGRESS NOTES
Estrellita AlcantaraSeth Willams,  Your results came back and are within acceptable limits. -Ferritin (iron) level is normal.. If you have any further concerns please do not hesitate to contact us by message, phone or making an appointment.  Have a good day   Dr Lul LIU

## 2018-11-09 ENCOUNTER — TELEPHONE (OUTPATIENT)
Dept: FAMILY MEDICINE | Facility: CLINIC | Age: 43
End: 2018-11-09

## 2018-11-09 DIAGNOSIS — G89.29 OTHER CHRONIC PAIN: Primary | ICD-10-CM

## 2018-11-09 DIAGNOSIS — M25.50 MULTIPLE JOINT PAIN: ICD-10-CM

## 2018-11-09 NOTE — TELEPHONE ENCOUNTER
Reason for Call:  Request for results:    Name of test or procedure: T4 FREE    Date of test of procedure: 11/5    Location of the test or procedure: [place]    OK to leave the result message on voice mail or with a family member? YES    Phone number Patient can be reached at:  Home number on file [phone] (home)    Additional comments: Pt informed of results but would still like to speak to a RN about them    Call taken on 11/9/2018 at 3:22 PM by Cindy Phelan

## 2018-11-09 NOTE — TELEPHONE ENCOUNTER
Dr. Mccarty   Patient is worried about the elevated TSH - anything she needs to do now prior to her appt. On 11/19/18?      Phone call to patient     Reviewed all labs - she has an appt. To f/u with Dr. Mccarty on 11/19/18 and Dr. Mccarty stated she would discuss mildly abnormal TSH at that time, patient is a bit worried about this and if anything can be shared before her f/u would  be appreciated, will send to Dr. Mccarty     Patient is still having achiness in hands, stiffness and she really thought that the autoimmune testing would be positive - discussed that this is good news, although we dont have an answer as to why she is feeling that way     Patient was encouraged to set up the appointments for ortho/pain mgmt/mental health as ordered    Advised to use warm/cool to help with pain/stiffness   Rest - eat healthy diet     Lea John Registered Nurse   [facility]

## 2018-11-13 NOTE — TELEPHONE ENCOUNTER
Left message to call back and ask to speak with an available triage nurse.  KATIE Vega, ROSALINDAN, RN

## 2018-11-13 NOTE — TELEPHONE ENCOUNTER
Her tsh is minimally elevated to point where we dont start treatment especially as the other thyroid test is completely normal  both have to be abnormal and tsh above 10 to initiate treatment  She has no autoimmune disease which is good  Her chronic aches and pains are likely fibromyalgia the treatment of which is multifocal like counseling, cymbalta, serotonin specific reuptake inhibitor, seeing pain and avoiding all narcotics  I can put in a pain referral for holistic evaluation and management of her chroic issues

## 2018-11-13 NOTE — TELEPHONE ENCOUNTER
Called patient to schedule new patient evaluation. Phone connected and then disconnected. Letter sent.      Pati Donovan    [facility]

## 2019-03-04 ENCOUNTER — TELEPHONE (OUTPATIENT)
Dept: FAMILY MEDICINE | Facility: CLINIC | Age: 44
End: 2019-03-04

## 2019-03-04 NOTE — LETTER
[facility]  [address] [place] [id number] [phone]      April 9, 2019    Nasrin Willams                                                                                                                     [address] [facility] [address]            Dear Nasrin,     It looks like you are overdue for some questionnaires. If you could please fill them out and return them or bring them with to your next office visit. Thank you and we hope you are well.      Sincerely,         [facility]

## 2019-03-04 NOTE — TELEPHONE ENCOUNTER
LVMTCB and speak to any MA regarding PHQ9 questionnaire.    Pamella Roland MA on 3/4/2019 at 12:34 PM

## 2019-03-19 NOTE — TELEPHONE ENCOUNTER
LVMTCB and speak to any MA regarding PHQ9 questionnaire.    2nd attempt    Pamella Roland MA on 3/19/2019 at 12:19 PM

## 2019-04-09 NOTE — TELEPHONE ENCOUNTER
LVMTCB and speak to any MA regarding PHQ9 questionnaire.    3rd attempt    Will send letter    Pamella Roland MA on 4/9/2019 at 12:21 PM

## 2019-04-23 ENCOUNTER — TELEPHONE (OUTPATIENT)
Dept: FAMILY MEDICINE | Facility: CLINIC | Age: 44
End: 2019-04-23

## 2019-04-23 NOTE — TELEPHONE ENCOUNTER
I have attempted to contact this patient by phone with the following results: left a VM.    1st attempt    Jayme Hawley MA on 4/23/2019 at 4:35 PM

## 2019-10-01 ENCOUNTER — HEALTH MAINTENANCE LETTER (OUTPATIENT)
Age: 44
End: 2019-10-01

## 2021-01-15 ENCOUNTER — HEALTH MAINTENANCE LETTER (OUTPATIENT)
Age: 46
End: 2021-01-15

## 2021-09-04 ENCOUNTER — HEALTH MAINTENANCE LETTER (OUTPATIENT)
Age: 46
End: 2021-09-04

## 2022-02-19 ENCOUNTER — HEALTH MAINTENANCE LETTER (OUTPATIENT)
Age: 47
End: 2022-02-19

## 2022-04-16 ENCOUNTER — HEALTH MAINTENANCE LETTER (OUTPATIENT)
Age: 47
End: 2022-04-16

## 2022-10-22 ENCOUNTER — HEALTH MAINTENANCE LETTER (OUTPATIENT)
Age: 47
End: 2022-10-22

## 2023-04-01 ENCOUNTER — HEALTH MAINTENANCE LETTER (OUTPATIENT)
Age: 48
End: 2023-04-01

## 2023-06-01 ENCOUNTER — HEALTH MAINTENANCE LETTER (OUTPATIENT)
Age: 48
End: 2023-06-01

## 2025-03-02 NOTE — TELEPHONE ENCOUNTER
Removed intact Writer called patient and reviewed message per Dr. Mccarty.    Patient verbalized understanding and in agreement with plan.    Reviewed pain management scheduling number with patient, as patient stated she received many different phone calls for scheduling so unsure who exactly called her.    Patient not able to participate in physical therapy at this time.    Patient plans to follow up with Dr. Mccarty at physical scheduled on 11/19/18.    MINOO Reeves, RN